# Patient Record
Sex: MALE | URBAN - METROPOLITAN AREA
[De-identification: names, ages, dates, MRNs, and addresses within clinical notes are randomized per-mention and may not be internally consistent; named-entity substitution may affect disease eponyms.]

---

## 2023-12-21 ENCOUNTER — LAB (OUTPATIENT)
Dept: LAB | Facility: CLINIC | Age: 28
End: 2023-12-21

## 2023-12-21 DIAGNOSIS — Z31.440 ENCOUNTER OF MALE FOR TESTING FOR GENETIC DISEASE CARRIER STATUS FOR PROCREATIVE MANAGEMENT: ICD-10-CM

## 2023-12-21 DIAGNOSIS — Z31.440 ENCOUNTER OF MALE FOR TESTING FOR GENETIC DISEASE CARRIER STATUS FOR PROCREATIVE MANAGEMENT: Primary | ICD-10-CM

## 2023-12-21 PROCEDURE — 36415 COLL VENOUS BLD VENIPUNCTURE: CPT

## 2023-12-21 NOTE — PROGRESS NOTES
"[facility]  Genetic Counseling Consult    Patient:  Twin Pizano  Preferred Name: Twin YOB: 1995   Date of Service:  23   MRN: [id number]    Twin was seen at the [facility] for genetic consultation as part of his partner's visit. The indication for genetic counseling is consent for carrier screening.     The session was conducted in English.      IMPRESSION/ PLAN   During today's [facility] visit, Twin had a blood draw for carrier screening. Results are expected in 2-3 weeks. The patient's partner, Shazia, will be called with results and if they do not answer they requested a detailed message with results on their voicemail. Consent to communicate medical information with Shazia was signed. Patient was informed that results will be available in [employer].        FAMILY HISTORY   A three-generation pedigree was obtained today and is scanned under the \"Media\" tab in Epic. The family history was reported by Twin.    The following significant findings were reported today:   Twin, 28, is healthy  Twin's maternal grandfather had Parkinson's disease    Twin's paternal uncle had trisomy 21   Twin's paternal aunt has healthy children but also had one child that was stillbirth or  in infancy for unknown reasons  Twin has many health paternal first cousins. One cousin had a child that  a few months after birth due to birth defects. Twin is unsure of further details.     Otherwise, the reported family history is unremarkable for multiple miscarriages, stillbirths, birth defects, intellectual disabilities, known genetic conditions, and consanguinity.       RISK ASSESSMENT FOR INHERITED CONDITIONS AND CARRIER SCREENING OPTIONS   Expanded carrier screening is available to screen for autosomal recessive conditions and X-linked conditions in a large list of genes. Carrier screening does not " test the pregnancy but gives a risk assessment for the pregnancy and future pregnancies to have the condition. Expanded carrier screening is designed to identify carrier status for conditions that are primarily childhood or adolescent onset. Expanded carrier screening does not evaluate for adult-onset conditions such as hereditary cancer syndromes, dementia/ Alzheimer's disease, or cardiovascular disease risk factors. Additionally, expanded carrier screening is not comprehensive for all known genetic diseases or inherited conditions. Carrier screening does not test for all genetic and health conditions or risk factors.     Autosomal recessive conditions happen when a mutation has been inherited from the egg and sperm and include conditions like cystic fibrosis, thalassemia, hearing loss, spinal muscular atrophy, and more. We reviewed that when both biological parents carry a harmful genetic change in a gene associated with autosomal recessive inheritance, each of their pregnancies has a 1 in 4 (25%) chance to be affected by that condition. X-linked conditions happen when a mutation has been inherited from the egg and include conditions like fragile X syndrome.With x-linked conditions, the specific risk generally depends on the chromosomal sex of the fetus, with XY individuals (generally male) being most severely affected.     [place] screening was reviewed. [place]  Screening    The patient does NOT have a family history of known inherited conditions. This does NOT mean the patient and/or their partner is not a carrier of a condition. Approximately 90% of couples at an increased reproductive risk for an inherited condition have no family history of that condition.     The patient has not had carrier screening previously.     The patient elected to pursue carrier screening today. The screening will include 557 genes, not including fragile X syndrome, through [facility].. See below for the more  detailed information we discussed.     We discussed carrier screening can be done before or during apregnancy. The purpose of carrier screening is providing an individual or couple with a personalized risk assessment for genetic conditions. This is accomplished by screening an individual to determine if they are a carrierfor a genetic condition. For most conditions, both parents must be a carrier of the condition for the pregnancy to be at an increased risk. For other conditions that are X-linked, there is an increased risk when a female(mother) is a carrier and the concerns are greater if she passes that condition to a son.     Carrier screening is an option and a personal decision to pursue. If an individual or couple is interested or wishes to pursue carrier screening the following is discussed:  For most genetic conditions, carriers are healthy individuals. For autosomal recessive conditions (ex cystic fibrosis, sickle cell anemia, etc), individuals have two copies of each gene. Carrier individuals have a mutation in one copy. For X-linked conditions, females have two copies of each gene and are considered carriers if one copy has amutation. Males only have one copy of an X-linked gene, therefore, if that one copy has a mutation they are affected by the condition, rather than only being a carrier    For select conditions, carriers can have symptoms, however, the chance is variable. Examples of these conditions include:  Female premutation carriers of fragile X syndrome can have symptoms in adulthood including premature ovarian failure and ataxia. If a female passes the mutation to a son they are at a high risk for fragile X syndrome, which is themost common genetic cause for autism spectrum disorder  Female or male carriers of Gaucher disease can have an increased chance for developing Parkinson's disease. Gaucher disease is a severe metabolic disorder.     If both parents are carriers of an autosomal recessive  condition, there are three possible outcomes for each pregnancy/child: 25% unaffected, 50% carrier, and 25% affected. The only method to determine the outcome or diagnosis the condition in a pregnancy is an invasive testing option such as an amniocentesis. Some genetic conditions will have ultrasound findings during the pregnancybut many do not. Some couples will choose the diagnostic testing to plan for delivery or choose termination of an affected pregnancy. Other couples will choose to test for the condition after delivery. While these conditionscannot be cured or treated during the pregnancy it may guide management recommendations (ultrasounds) for pregnancy as well as delivery and infant care.     Shazia and Twin wished to pursue carrier screening. The following was discussed as part of informed consent in addition to the above information:  We discussed [facility] carrier screening which is offered with several different panels. Shazia choose the comprehensive panel which includes 557 conditions.     Carrier screening is not meant to diagnose the patient with a condition, and generally carriers are asymptomatic. However, certain genes may confer increased risks for various health concerns in carriers such as fragile X syndrome, Duchene muscular dystrophy, and Gaucher disease among others.     We discussed that expanded carrier screening is designed to identify carrier status for conditions that are primarily childhood or adolescent onset. Expanded carrier screening does not evaluate for adult-onset conditions such as hereditary cancer syndromes, dementia/ Alzheimer's disease, or cardiovascular disease risk factors. Additionally, expanded carrier screening is not comprehensive for all known genetic diseases or inherited conditions. This is a screening test, and residual carrier status risk figures will be provided to the patient after results become available.  We discussed there are limitations such as current  technology and rare chance of a false positive or negative.     [facility] laboratory will report on pseudodeficiency alleles, which are benign variants that are not known to be associated with disease and are not thought to impact the individuals risk to be a carrier for these conditions. However, the presence of pseudodeficiency alleles can exhibit false positive results on biochemical. Therefore, disclosing this information to patients may aid in interpretation of a  screen flag.     Results are typically available in 10-21 days. We will call Shazia with the results and the report will eventually be available via [facility]'s patient portal. Twin signed a consent to communicate for the results to be called to Shazia    There are implications for family members. If an individual is a carrier of a condition there is a chance for relatives to also be a carrier. This may be helpful information to disclose to family (siblings, cousins) so they may choose if they want to pursue carriers screening. In addition, if only one parent is found to be a carrier, there is a 50% chance for each child to be a carrier. This may be helpful information for the patient's children when they start a family.    We reviewed that there is a law in place, the Genetic Information Nondiscrimination Act (ELLY), that protects patients from discrimination by health insurance companies and employers based on their genetic information. ELLY does not protect against discrimination by life insurance companies or disability insurance.    Carrier screening is typically run through a lab called [facility] is not responsible for this billing, it is handled entirely by [facility].  With testing there are two billing options, insurance or self pay. If insurance billing is chosen, while the test is running, [facility] will process your insurance. They will initiate a prior authorization if needed. [facility] billing will reach out to  you if the cost of the test is more than $100. You may hear from them after your test has already resulted. [facility] billing will assist you in exploring options to reduce the cost such as the patient assistance program. You can see if you qualify by following this link, [email or web address]?tab=[place]  and scroll down to the tool that lets you add in household members, income etc. If someone does not qualify for the program, [employer] may offer payment plans. Any payment you make to [employer] will go towards your insurance deductible and out of pocket maximum.     Because [facility] is responsible for the billing process, [facility] and/or genetic counselors have limited ability to help with determining the exact costs or best option for patients.  For more information, questions, or concerns, you can always email [email or web address] or call [phone] if you have more questions.     Occasionally, a patient and genetic counselor will decide that testing through a different lab would be more appropriate for that patient's specific situation and the billing procedure and patient pay options would be different. This will be discussed in more detail with the genetic counselor at the time of ordering.       It was a pleasure to be involved with Twin petra hoover.    Sue Lua GC, MS, [facility]  Board Certified and [place] Licensed Genetic Counselor   [facility]  Maternal Fetal Medicine  Office: [phone]  [facility]: [phone]   Fax: [phone]  [facility]

## 2023-12-31 ENCOUNTER — HEALTH MAINTENANCE LETTER (OUTPATIENT)
Age: 28
End: 2023-12-31

## 2024-01-03 ENCOUNTER — DOCUMENTATION ONLY (OUTPATIENT)
Dept: MATERNAL FETAL MEDICINE | Facility: CLINIC | Age: 29
End: 2024-01-03

## 2024-01-03 LAB — SCANNED LAB RESULT: ABNORMAL

## 2024-01-03 NOTE — PROGRESS NOTES
January 3, 2024    Per patient request, I spoke with Twin's partner, Shazia, to discuss the couple's expanded carrier screening results ([facility] Comprehensive Carrier Screen, 558 conditions). Shazia was found to be a carrier for none of the conditions on the panel. Twin had [facility] Comprehensive Carrier Screening for 557 conditions (excluding Fragile X syndrome), was found to be a carrier for 1 condition on the panel, described below. Overall, Shazia and Twin were not found to be a carrier for the same condition.     Most of the conditions on the carrier screening panel are inherited in an autosomal recessive fashion. Every individual has two copies of the gene that is responsible for this condition, and if someone has a change or mutation that impacts how one copy of the gene functions, they are called a carrier for the condition.  If someone has two copies of the gene that have a harmful change, they are affected with the condition.  If two people who are carriers for the same condition have children, there is a chance for their children to be affected.  Each parent has a 50% chance for passing on their copy of the gene with a mutation, so there is a 25% chance for each pregnancy to get two copies of the mutation and be affected, a 50% chance for each pregnancy to be an unaffected carrier, and a 25% chance to be unaffected with two normal copies of the gene.    Shazia was not found to be a carrier for any of the 558 conditions on the panel.     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 Severity of hearing loss can vary, even among affected individuals in the same family.  This particular variant is expected to be associated  with DFNB1 and NOT DFNA3. It is also a reduced penetrance variant, meaning that not all individuals with this variant will have associated symptoms.   Some individuals with a GJB2 variant may also have nonsyndromic deafness due to a deletion on the other chromosome that includes an portion of DNA upstream of GJB2 that encompasses part of GJB6. [facility] assay includes specific targets for this region.   Since Shazia was NOT identified to be a carrier for this condition, the couple's residual reproductive risk is 1 in 19,600.    No further screening or testing for the couple or a future pregnancy is indicated.  Again, while the chances of the aforementioned conditions is low, because the detection rate of testing is not 100%, if there is ever concern for symptoms in the couple's children in the future, referral to an appropriate practitioner for evaluation is recommended.       The couple's children will have a 50% chance of being an unaffected carrier of the aforementioned condition.  Genetic counseling for the couple's children is recommended when they are of reproductive age.    We discussed that Shazia and Twin can share this information with relatives if they feel comfortable. Siblings would be at a 50% chance of also being a carrier for the same condition.     A copy of this result will be available in Epic, which Shazia has already been able to view through [facility].     Meme Coburn MS, [facility]  Certified and Licensed Genetic Counselor  [facility]  Maternal Fetal Medicine  Office: [phone]  [facility]: [phone]   Fax: [phone]  [facility]